# Patient Record
(demographics unavailable — no encounter records)

---

## 2025-03-03 NOTE — ASSESSMENT
[FreeTextEntry1] : Patient is a 76 yo M who presents for BPH/LUTS.  He is s/p ?TURP surgery in [place] in 9/2023. He has persistent and bothersome irritative LUTS - DTF and nocturia. He has difficulty sleeping due to nocturia. He remains on flomax. PVR today is 31 cc D/w pt that would perform cysto and UDS to further assess his bladder function D/w pt that it is unclear if he had incomplete prostate resection, TERAN vs OAB  D/w pt that treatment options differ depending on etiology.  F/u for cysto, UDS at  office  Pt will require longitudinal care for pt's chronic/complex urologic conditions.

## 2025-03-03 NOTE — PHYSICAL EXAM
[General Appearance - Well Developed] : well developed [Normal Appearance] : normal appearance [General Appearance - In No Acute Distress] : no acute distress [] : no respiratory distress [Exaggerated Use Of Accessory Muscles For Inspiration] : no accessory muscle use [Urinary Bladder Findings] : the bladder was normal on palpation [Normal Station and Gait] : the gait and station were normal for the patient's age [No Focal Deficits] : no focal deficits [Abdomen Soft] : soft [Abdomen Tenderness] : non-tender

## 2025-03-03 NOTE — HISTORY OF PRESENT ILLNESS
[FreeTextEntry1] : Patient is a 75 year old M with primary medical history of HTN, BPH s/p ?TURP in 2023 who presents for BPH/LUTS.   History of BPH/LUTS, status post ?TURP surgery in 9/2023 in [place].  Prior to surgery, LUTS -- urinary frequency, urgency, DTF 30-40 minutes daytime, nocturia q 1 hour, incomplete emptying sensation, urinary hesitancy, straining. He reports no improvement of LUTS following surgery.  No urinary incontinence.  He has taken tamsulosin 0.4 mg daily postoperatively without change.  Labs from PCP 12/2024 - PSA 1.12, UA neg for blood/infection, Cr 1.2.   No history of UTI, urinary retention.   Denies family history of  malignancy.  Never a smoker.

## 2025-03-03 NOTE — HISTORY OF PRESENT ILLNESS
[FreeTextEntry1] : Patient is a 75 year old M with primary medical history of HTN, BPH s/p ?TURP in 2023 who presents for BPH/LUTS.   History of BPH/LUTS, status post ?TURP surgery in 9/2023 in [place].  Prior to surgery, LUTS -- urinary frequency, urgency, DTF 30-40 minutes daytime, nocturia q 1 hour, incomplete emptying sensation, urinary hesitancy, straining. He reports no improvement of LUTS following surgery.  No urinary incontinence.  He has taken tamsulosin 0.4 mg daily postoperatively without change.  Labs from PCP 12/2024 - PSA 1.12, UA neg for blood/infection, Cr 1.2.   No history of UTI, urinary retention.   Denies family history of  malignancy.  Never a smoker.

## 2025-03-03 NOTE — ASSESSMENT
[FreeTextEntry1] : Patient is a 76 yo M who presents for BPH/LUTS.  He is s/p ?TURP surgery in [place] in 9/2023. He has persistent and bothersome irritative LUTS - DTF and nocturia. He has difficulty sleeping due to nocturia. He remains on flomax. PVR today is 31 cc D/w pt that would perform cysto and UDS to further assess his bladder function D/w pt that it is unclear if he had incomplete prostate resection, TERAN vs OAB  D/w pt that treatment options differ depending on etiology.  F/u for cysto, UDS at  office  Pt will require longitudinal care for pt's chronic/complex urologic conditions.

## 2025-05-05 NOTE — ASSESSMENT
[FreeTextEntry1] : Patient is a 75 year old M with primary medical history of HTN, BPH s/p ?TURP in 2023 who presents for BPH/LUTS. H/o TURP in 2023 in [place]. Bothered by irritative LUTS - improved with solifenacin 5 mg trial.  Mild GI/constipation - manageable. He is traveling to [place] for next 3 months Will renew vesicare, d/w pt that on his return if still bothered by SE and no further improvement, could consider alternative trial of mirabegron for LUTS. PVR today is acceptable.  F/u 3-4 mo upon return from [place]

## 2025-05-05 NOTE — HISTORY OF PRESENT ILLNESS
[FreeTextEntry1] : Patient is a 75 year old M with primary medical history of HTN, BPH s/p ?TURP in 2023 who presents for BPH/LUTS.  Prior hx: History of BPH/LUTS, status post ?TURP surgery in 9/2023 in [place]. Prior to surgery, LUTS -- urinary frequency, urgency, DTF 30-40 minutes daytime, nocturia q 1 hour, incomplete emptying sensation, urinary hesitancy, straining. He reports no improvement of LUTS following surgery. No urinary incontinence. He has taken tamsulosin 0.4 mg daily postoperatively without change. Labs from PCP 12/2024 - PSA 1.12, UA neg for blood/infection, Cr 1.2.  No history of UTI, urinary retention.  Denies family history of  malignancy. Never a smoker.  5/5/25 Patient underwent cystoscopy with UDS which noted possibly borderline TERAN and very mild BNC. Visually the prostate was resected and not visibly obstructed. He was given trial of Solifenacin, and to continue flomax. He reports improved nocturia - now every 1.5 hour, and DTF q 40-60 minutes.  He reports mild GI side effects, bloating and mild constipation and managed by increase fluid intake and laxative. He feels sensation of emptying well.  No dysuria or hematuria or difficulty of urination.